# Patient Record
Sex: MALE | Race: BLACK OR AFRICAN AMERICAN | NOT HISPANIC OR LATINO | Employment: FULL TIME | URBAN - METROPOLITAN AREA
[De-identification: names, ages, dates, MRNs, and addresses within clinical notes are randomized per-mention and may not be internally consistent; named-entity substitution may affect disease eponyms.]

---

## 2023-04-27 ENCOUNTER — OFFICE VISIT (OUTPATIENT)
Dept: SURGERY | Facility: CLINIC | Age: 53
End: 2023-04-27
Payer: COMMERCIAL

## 2023-04-27 VITALS — WEIGHT: 192 LBS | HEIGHT: 73 IN | BODY MASS INDEX: 25.45 KG/M2

## 2023-04-27 DIAGNOSIS — Z12.11 SCREENING FOR COLON CANCER: Primary | ICD-10-CM

## 2023-04-27 RX ORDER — LORATADINE 10 MG/1
10 TABLET ORAL DAILY
COMMUNITY

## 2023-04-27 RX ORDER — AMLODIPINE BESYLATE 5 MG/1
5 TABLET ORAL 2 TIMES DAILY
COMMUNITY
Start: 2023-04-15

## 2023-04-27 NOTE — PROGRESS NOTES
Chief Complaint   Patient presents with   • PD Catheter Consult       Subjective      Edward Prado is a 52 y.o. male who is referred by Mack Kaba* to be evaluated for peritoneal dialysis catheter placement. Patient has CKD secondary to hypertensive nephrosclerosis and  is not on dialysis. Patient does not have a AV access.  Patient has not had a recent intraabdominal infection. The patient reports having regular  bowel movements.  Patient did not have a Colonoscopy.     Home Evaluation: No    Past Medical History:   Diagnosis Date   • Hypertension    • Kidney disease     stage 5     Past Surgical History:   Procedure Laterality Date   • SHOULDER SURGERY Right 1989       Current Outpatient Medications:   •  amLODIPine (NORVASC) 5 MG tablet, Take 1 tablet by mouth 2 (Two) Times a Day., Disp: , Rfl:   •  loratadine (CLARITIN) 10 MG tablet, Take 1 tablet by mouth Daily., Disp: , Rfl:   •  metoprolol tartrate (LOPRESSOR) 25 MG tablet, Take 1 tablet by mouth 2 (Two) Times a Day., Disp: , Rfl:     No Known Allergies    Family History   Problem Relation Age of Onset   • Cancer Mother         Breast and colon   • Diabetes Father    • Heart block Father        Social History     Socioeconomic History   • Marital status: Unknown   Tobacco Use   • Smoking status: Never   • Smokeless tobacco: Never   Vaping Use   • Vaping Use: Never used   Substance and Sexual Activity   • Alcohol use: Yes     Comment: socially   • Drug use: Never   • Sexual activity: Defer     REVIEW OF SYSTEMS    Review of Systems   Constitutional: Negative for activity change and appetite change.   HENT: Negative for congestion and dental problem.    Eyes: Negative for discharge and itching.   Respiratory: Negative for apnea, choking, chest tightness and stridor.    Cardiovascular: Negative for chest pain and leg swelling.   Gastrointestinal: Negative for abdominal distention and abdominal pain.   Endocrine: Negative for cold intolerance and  "heat intolerance.   Genitourinary: Negative for frequency and genital sores.   Musculoskeletal: Negative for arthralgias and back pain.   Skin: Negative for color change and pallor.   Allergic/Immunologic: Negative for environmental allergies and food allergies.   Neurological: Negative for dizziness, light-headedness and headaches.   Hematological: Negative for adenopathy. Does not bruise/bleed easily.   Psychiatric/Behavioral: Negative for agitation and hallucinations.       Physical Examination  Ht 185.4 cm (73\")   Wt 87.1 kg (192 lb)   BMI 25.33 kg/m²   Body mass index is 25.33 kg/m².  Physical Exam  Constitutional:       Appearance: Normal appearance.   HENT:      Head: Normocephalic and atraumatic.      Nose: Nose normal.      Mouth/Throat:      Mouth: Mucous membranes are dry.   Eyes:      General: No scleral icterus.     Conjunctiva/sclera: Conjunctivae normal.   Cardiovascular:      Rate and Rhythm: Normal rate and regular rhythm.   Pulmonary:      Effort: Pulmonary effort is normal.      Breath sounds: Normal breath sounds.   Abdominal:      General: Bowel sounds are normal. There is no distension.      Palpations: Abdomen is soft. There is no mass.      Tenderness: There is no abdominal tenderness.      Hernia: A hernia is present.          Comments: Possible small umbilical hernia, possible left inguinal hernia or lipoma the cord   Musculoskeletal:      Cervical back: Normal range of motion and neck supple.   Neurological:      Mental Status: He is alert.       Labs: 4/15/2023  Sodium 134, potassium 3.3, CO2 21, creatinine 7, BUN 60  4/10/2023 hemoglobin 12.6, hematocrit 36.9, platelets 243    Assessment:   Edward Prado is a 52 y.o. male with CKD due to  hypertensive nephrosclerosis that is not on dialysis and will need peritoneal dialysis catheter placement in the future.  Patient  will need to have a colonoscopy.  I offered him screening colonoscopy.  Risk and benefits of procedure including " bleeding, infection, perforation discussed in detail with the patient verbalized understanding.      The peritoneal dialysis catheter procedure was explained in detail to the patient including risks and benefits.  The benefits including the possibility of having dialysis at home without the side effects of the hemodialysis.  The risks including but not limited to catheter dislodgment, obstruction, malfunction, bleeding, infection and possible injury to surrounding organs during peritoneal access. He is interested in proceeding with laparoscopic placement of peritoneal dialysis catheter whenever cleared by nephrologist. The patient understands that the catheter will not be used for dialysis for a period of approximately 2 weeks after its placement and that during this time they should not shower until the exit site is completely healed. Patient verbalized understanding and agreed with the plan. All questions were answered at this time.      Plan:     -Patient to call when ready for peritoneal dialysis catheter placement  -Screening colonoscopy to be done whenever patient ready.  Number for scheduling was given to him    Orders Placed This Encounter   Procedures   • Obtain Informed Consent     Standing Status:   Future     Order Specific Question:   Informed Consent Given For     Answer:   colonoscopy       Nilesh Ramey MD  General, Minimally Invasive and Endoscopic Surgery  [address], [address]  [place], [place], [address]  P: [phone]  F: [phone]

## 2023-09-20 ENCOUNTER — PREP FOR SURGERY (OUTPATIENT)
Dept: OTHER | Facility: HOSPITAL | Age: 53
End: 2023-09-20
Payer: COMMERCIAL

## 2023-09-20 DIAGNOSIS — Z12.11 SCREEN FOR COLON CANCER: Primary | ICD-10-CM

## 2023-10-11 PROBLEM — Z12.11 SCREEN FOR COLON CANCER: Status: ACTIVE | Noted: 2023-09-20

## 2023-10-23 RX ORDER — METOPROLOL TARTRATE 100 MG/1
100 TABLET ORAL 2 TIMES DAILY
COMMUNITY
Start: 2023-09-08

## 2023-10-23 RX ORDER — CALCIUM ACETATE 667 MG/1
667 TABLET ORAL EVERY EVENING
COMMUNITY
Start: 2023-09-12

## 2023-10-24 ENCOUNTER — HOSPITAL ENCOUNTER (OUTPATIENT)
Facility: HOSPITAL | Age: 53
Setting detail: HOSPITAL OUTPATIENT SURGERY
Discharge: HOME | End: 2023-10-24
Attending: SURGERY | Admitting: SURGERY
Payer: COMMERCIAL

## 2023-10-24 ENCOUNTER — ANESTHESIA (OUTPATIENT)
Dept: GASTROENTEROLOGY | Facility: HOSPITAL | Age: 53
End: 2023-10-24
Payer: COMMERCIAL

## 2023-10-24 ENCOUNTER — ANESTHESIA EVENT (OUTPATIENT)
Dept: GASTROENTEROLOGY | Facility: HOSPITAL | Age: 53
End: 2023-10-24
Payer: COMMERCIAL

## 2023-10-24 VITALS
HEIGHT: 73 IN | SYSTOLIC BLOOD PRESSURE: 128 MMHG | WEIGHT: 188 LBS | HEART RATE: 58 BPM | OXYGEN SATURATION: 98 % | DIASTOLIC BLOOD PRESSURE: 89 MMHG | BODY MASS INDEX: 24.92 KG/M2 | RESPIRATION RATE: 12 BRPM

## 2023-10-24 PROCEDURE — 45378 DIAGNOSTIC COLONOSCOPY: CPT | Performed by: SURGERY

## 2023-10-24 PROCEDURE — 25010000002 PROPOFOL 10 MG/ML EMULSION

## 2023-10-24 PROCEDURE — 25810000003 SODIUM CHLORIDE 0.9 % SOLUTION: Performed by: SURGERY

## 2023-10-24 RX ORDER — SODIUM CHLORIDE 9 MG/ML
1000 INJECTION, SOLUTION INTRAVENOUS CONTINUOUS
Status: DISCONTINUED | OUTPATIENT
Start: 2023-10-24 | End: 2023-10-24 | Stop reason: HOSPADM

## 2023-10-24 RX ORDER — PROPOFOL 10 MG/ML
VIAL (ML) INTRAVENOUS AS NEEDED
Status: DISCONTINUED | OUTPATIENT
Start: 2023-10-24 | End: 2023-10-24 | Stop reason: SURG

## 2023-10-24 RX ORDER — LIDOCAINE HYDROCHLORIDE 20 MG/ML
INJECTION, SOLUTION INFILTRATION; PERINEURAL AS NEEDED
Status: DISCONTINUED | OUTPATIENT
Start: 2023-10-24 | End: 2023-10-24 | Stop reason: SURG

## 2023-10-24 RX ADMIN — PROPOFOL 50 MG: 10 INJECTION, EMULSION INTRAVENOUS at 09:03

## 2023-10-24 RX ADMIN — LIDOCAINE HYDROCHLORIDE 60 MG: 20 INJECTION, SOLUTION INFILTRATION; PERINEURAL at 09:03

## 2023-10-24 RX ADMIN — PROPOFOL 180 MCG/KG/MIN: 10 INJECTION, EMULSION INTRAVENOUS at 09:05

## 2023-10-24 RX ADMIN — SODIUM CHLORIDE 1000 ML: 9 INJECTION, SOLUTION INTRAVENOUS at 08:56

## 2023-10-24 NOTE — H&P
SURGERY  Edward Prado  1970    10/24/23    HPI: 52 y.o. male here for screening colonoscopy.  He has not had any prior colonoscopy.  Denies a family history of colon cancer.  He does have chronic kidney disease and has previously been evaluated by my partner Dr. Ramey for peritoneal dialysis catheter placement.    Past Medical History:   Diagnosis Date    CKD (chronic kidney disease), stage V     Colon cancer screening     Hypertension        Past Surgical History:   Procedure Laterality Date    SHOULDER SURGERY Right 1989       has No Known Allergies.       Medication List        ASK your doctor about these medications      amLODIPine 5 MG tablet  Commonly known as: NORVASC     calcium acetate 667 MG tablet  Commonly known as: PHOSLO     metoprolol tartrate 100 MG tablet  Commonly known as: LOPRESSOR              Family History   Problem Relation Age of Onset    Cancer Mother         Breast and colon    Diabetes Father     Heart block Father     Malig Hyperthermia Neg Hx        Social History     Socioeconomic History    Marital status:    Tobacco Use    Smoking status: Never    Smokeless tobacco: Never   Vaping Use    Vaping Use: Never used   Substance and Sexual Activity    Alcohol use: Yes     Comment: socially    Drug use: Never    Sexual activity: Defer       Vitals:    10/24/23 0833   BP: (!) 166/109   Pulse: 65   Resp: 16   SpO2: 100%       Body mass index is 24.8 kg/m².    Physical Exam    General: No acute distress  Lungs: No labored breathing, Pulse oximetry on room air is 100%.  Heart: RRR  Abdo: Soft  Mental:  Awake, alert, and oriented      Assessment/Plan  Encounter for Screening/Surveillance Colonoscopy  Proceed with screening colonoscopy.  Risk, benefits discussed including risk of perforation, bleeding.    Rafiq Dwyer MD  08:56 EDT

## 2023-10-24 NOTE — OP NOTE
Colonoscopy Procedure Note  Edward Prado  1970  Date of Procedure: 10/24/23    Pre-operative Diagnosis:    Encounter for screening colonoscopy    Post-operative Diagnosis:  Normal colonoscopy    Procedure: Colonoscopy to the terminal ileum    Findings/Treatments:   Normal appearance of terminal ileum  No colonic mucosal abnormality       Recommendations:   Repeat colonoscopy in 10 years.      Surgeon: Rafiq Dwyer MD    Anesthetic: MAC per Frank Villavicencio MD      Procedure Details:    MAC anesthesia was induced.  A digital rectal exam was performed along with visual inspection of the anus. The 180 Colonoscope was inserted into the rectum and advanced to the cecum, with relative ease.  Cecum was identified by the appendiceal orifice and the ileocecal valve and photographed for documentation.  Scope was advanced into the terminal ileum and this was normal in appearance.     A careful inspection was made as the scope was withdrawn, including a retroflexed view of the rectum.  There were no mucosal abnormalities encountered.. Retroflexion in the rectum revealed no abnormalities. Prep quality was excellent.      Rafiq Dwyer M.D.  General, Endoscopic, and Robotic Surgery  [facility]    [address] [place] [address], [address]  [place], [place], [address]  P: [phone]  F: [phone]

## 2023-10-24 NOTE — ANESTHESIA PREPROCEDURE EVALUATION
Anesthesia Evaluation     Patient summary reviewed and Nursing notes reviewed                Airway   Mallampati: II  TM distance: >3 FB  Neck ROM: full  Dental      Pulmonary - negative pulmonary ROS   Cardiovascular     Patient on routine beta blocker  Rhythm: regular  Rate: normal    (+) hypertension      Neuro/Psych- negative ROS  GI/Hepatic/Renal/Endo    (+) renal disease- CRI    Musculoskeletal (-) negative ROS    Abdominal    Substance History   (+) alcohol use     OB/GYN negative ob/gyn ROS         Other                      Anesthesia Plan    ASA 3     MAC     (CRI with left AVF  HTN)  intravenous induction     Anesthetic plan, risks, benefits, and alternatives have been provided, discussed and informed consent has been obtained with: patient.    Plan discussed with CRNA.    CODE STATUS:

## 2023-10-24 NOTE — ANESTHESIA POSTPROCEDURE EVALUATION
Patient: Edward Prado    Procedure Summary       Date: 10/24/23 Room / Location: [facility] ENDOSCOPY 1 /  CHEMO ENDOSCOPY    Anesthesia Start: 0859 Anesthesia Stop: 0927    Procedure: COLONOSCOPY to cecum and into terminal ileum Diagnosis:       Screen for colon cancer      (Screen for colon cancer [Z12.11])    Surgeons: Rafiq Dwyer MD Provider: Frank Villavicencio MD    Anesthesia Type: MAC ASA Status: 3            Anesthesia Type: MAC    Vitals  Vitals Value Taken Time   /89 10/24/23 0944   Temp     Pulse 60 10/24/23 0948   Resp 12 10/24/23 0944   SpO2 99 % 10/24/23 0948   Vitals shown include unfiled device data.        Post Anesthesia Care and Evaluation    Patient location during evaluation: PACU  Patient participation: complete - patient participated  Level of consciousness: awake and alert  Pain management: adequate    Airway patency: patent  Anesthetic complications: No anesthetic complications    Cardiovascular status: acceptable  Respiratory status: acceptable  Hydration status: acceptable    Comments: --------------------            10/24/23               0944     --------------------   BP:       128/89     Pulse:      58       Resp:       12       SpO2:      98%      --------------------

## (undated) DEVICE — SENSR O2 OXIMAX FNGR A/ 18IN NONSTR

## (undated) DEVICE — TUBING, SUCTION, 1/4" X 10', STRAIGHT: Brand: MEDLINE

## (undated) DEVICE — CANN O2 ETCO2 FITS ALL CONN CO2 SMPL A/ 7IN DISP LF

## (undated) DEVICE — KT ORCA ORCAPOD DISP STRL

## (undated) DEVICE — LN SMPL CO2 SHTRM SD STREAM W/M LUER

## (undated) DEVICE — ADAPT CLN BIOGUARD AIR/H2O DISP